# Patient Record
Sex: FEMALE | Race: WHITE | NOT HISPANIC OR LATINO | URBAN - METROPOLITAN AREA
[De-identification: names, ages, dates, MRNs, and addresses within clinical notes are randomized per-mention and may not be internally consistent; named-entity substitution may affect disease eponyms.]

---

## 2022-07-28 ENCOUNTER — EMERGENCY (EMERGENCY)
Facility: HOSPITAL | Age: 27
LOS: 1 days | Discharge: HOME | End: 2022-07-28
Attending: EMERGENCY MEDICINE
Payer: COMMERCIAL

## 2022-07-28 VITALS
TEMPERATURE: 99 F | WEIGHT: 175.05 LBS | OXYGEN SATURATION: 100 % | RESPIRATION RATE: 20 BRPM | HEART RATE: 108 BPM | SYSTOLIC BLOOD PRESSURE: 166 MMHG | DIASTOLIC BLOOD PRESSURE: 103 MMHG

## 2022-07-28 PROCEDURE — 99285 EMERGENCY DEPT VISIT HI MDM: CPT

## 2022-07-28 NOTE — ED ADULT TRIAGE NOTE - CHIEF COMPLAINT QUOTE
C/O LLQ abd pain for the past week days associated with nausea and constipation.  Reports no BM for 6 days until yesterday when she used an enema. Also reports white chunky and foul smelling vaginal discharge.  Reports new sexual partners. Was seen at urgent care and had lab work done but was sent here for imaging.

## 2022-07-29 VITALS
OXYGEN SATURATION: 100 % | TEMPERATURE: 98 F | HEART RATE: 105 BPM | SYSTOLIC BLOOD PRESSURE: 142 MMHG | RESPIRATION RATE: 18 BRPM | DIASTOLIC BLOOD PRESSURE: 83 MMHG

## 2022-07-29 LAB
APPEARANCE UR: CLEAR — SIGNIFICANT CHANGE UP
BACTERIA # UR AUTO: ABNORMAL
BILIRUB UR-MCNC: NEGATIVE — SIGNIFICANT CHANGE UP
C TRACH RRNA SPEC QL NAA+PROBE: SIGNIFICANT CHANGE UP
COLOR SPEC: YELLOW — SIGNIFICANT CHANGE UP
DIFF PNL FLD: NEGATIVE — SIGNIFICANT CHANGE UP
EPI CELLS # UR: ABNORMAL
GLUCOSE UR QL: NEGATIVE MG/DL — SIGNIFICANT CHANGE UP
HCG UR QL: NEGATIVE — SIGNIFICANT CHANGE UP
KETONES UR-MCNC: NEGATIVE — SIGNIFICANT CHANGE UP
LEUKOCYTE ESTERASE UR-ACNC: ABNORMAL
N GONORRHOEA RRNA SPEC QL NAA+PROBE: SIGNIFICANT CHANGE UP
NITRITE UR-MCNC: NEGATIVE — SIGNIFICANT CHANGE UP
PH UR: 6 — SIGNIFICANT CHANGE UP (ref 5–8)
PROT UR-MCNC: NEGATIVE — SIGNIFICANT CHANGE UP
RBC CASTS # UR COMP ASSIST: SIGNIFICANT CHANGE UP /HPF (ref 0–4)
SP GR SPEC: 1.01 — SIGNIFICANT CHANGE UP (ref 1.01–1.02)
SPECIMEN SOURCE: SIGNIFICANT CHANGE UP
UROBILINOGEN FLD QL: NEGATIVE MG/DL — SIGNIFICANT CHANGE UP
WBC UR QL: SIGNIFICANT CHANGE UP /HPF (ref 0–5)

## 2022-07-29 PROCEDURE — 96372 THER/PROPH/DIAG INJ SC/IM: CPT

## 2022-07-29 PROCEDURE — 87491 CHLMYD TRACH DNA AMP PROBE: CPT

## 2022-07-29 PROCEDURE — 76856 US EXAM PELVIC COMPLETE: CPT | Mod: 26

## 2022-07-29 PROCEDURE — 99284 EMERGENCY DEPT VISIT MOD MDM: CPT | Mod: 25

## 2022-07-29 PROCEDURE — 76830 TRANSVAGINAL US NON-OB: CPT

## 2022-07-29 PROCEDURE — 81025 URINE PREGNANCY TEST: CPT

## 2022-07-29 PROCEDURE — 87070 CULTURE OTHR SPECIMN AEROBIC: CPT

## 2022-07-29 PROCEDURE — 76856 US EXAM PELVIC COMPLETE: CPT

## 2022-07-29 PROCEDURE — 87086 URINE CULTURE/COLONY COUNT: CPT

## 2022-07-29 PROCEDURE — 76830 TRANSVAGINAL US NON-OB: CPT | Mod: 26

## 2022-07-29 PROCEDURE — 81001 URINALYSIS AUTO W/SCOPE: CPT

## 2022-07-29 PROCEDURE — 87591 N.GONORRHOEAE DNA AMP PROB: CPT

## 2022-07-29 RX ORDER — CEFTRIAXONE 500 MG/1
500 INJECTION, POWDER, FOR SOLUTION INTRAMUSCULAR; INTRAVENOUS ONCE
Refills: 0 | Status: COMPLETED | OUTPATIENT
Start: 2022-07-29 | End: 2022-07-29

## 2022-07-29 RX ORDER — IBUPROFEN 200 MG
600 TABLET ORAL ONCE
Refills: 0 | Status: COMPLETED | OUTPATIENT
Start: 2022-07-29 | End: 2022-07-29

## 2022-07-29 RX ORDER — FLUCONAZOLE 150 MG/1
150 TABLET ORAL ONCE
Refills: 0 | Status: COMPLETED | OUTPATIENT
Start: 2022-07-29 | End: 2022-07-29

## 2022-07-29 RX ADMIN — CEFTRIAXONE 500 MILLIGRAM(S): 500 INJECTION, POWDER, FOR SOLUTION INTRAMUSCULAR; INTRAVENOUS at 02:30

## 2022-07-29 RX ADMIN — Medication 100 MILLIGRAM(S): at 02:29

## 2022-07-29 RX ADMIN — FLUCONAZOLE 150 MILLIGRAM(S): 150 TABLET ORAL at 02:42

## 2022-07-29 RX ADMIN — Medication 600 MILLIGRAM(S): at 03:56

## 2022-07-29 NOTE — ED PROVIDER NOTE - CARE PLAN
Principal Discharge DX:	Acute PID (pelvic inflammatory disease)  Secondary Diagnosis:	Vaginal yeast infection   1

## 2022-07-29 NOTE — ED PROVIDER NOTE - PROGRESS NOTE DETAILS
advised on safe sex practices and importance of fu with GYN considering friability of cervix and known HPV.   pending US pelvis pt made aware of results and all incidental findings. given copy of reports. instructed on follow up and strict return precautions. pt verbalizes understanding and given opportunity to ask further questions

## 2022-07-29 NOTE — ED PROVIDER NOTE - OBJECTIVE STATEMENT
26 yo female hx of prior yeast infections presenting to the Er with vaginal discharge white thick and clump like associated with pelvic cramping. reports several new sexual partners without protection over the last few weeks.  went to urgent care and had pelvic exam and sti testing but results not given as of yet. reports that she has some irritation with urination. no medication given or taken for symptoms. LMP 2 weeks ago. no sti prophylaxis given at urgent care denies prior hx of sti. denies fever chills. reports constipation over the last two weeks which was cleared with fleets enema as well as mirralax.  reports hx of HPV with next pap in september.

## 2022-07-29 NOTE — ED PROVIDER NOTE - ATTENDING APP SHARED VISIT CONTRIBUTION OF CARE
Will treat empirically for PID, no fever or abdominal tenderness concerning for TOA. Close OBGYN follow up.

## 2022-07-29 NOTE — ED PROVIDER NOTE - PHYSICAL EXAMINATION
Gen: Well appearing in NAD  Head: NC/AT  Neck: trachea midline  Resp:  No distress  Ext: no deformities  Neuro:  A&O appears non focal  Skin:  Warm and dry as visualized  Psych:  Normal affect and mood     chaperone PCA DAVIS: normal external genitalia. + thick white clumped discharge. cervix firable appearing closed. NO CMT + TTP over left adenexa. no palpable masses.

## 2022-07-29 NOTE — ED PROVIDER NOTE - CLINICAL SUMMARY MEDICAL DECISION MAKING FREE TEXT BOX
female presenting to the ER with vaginal discarge appearing to be yeast like with associated pelvic pain. concern for STI due to various sexual partners. advised on results, abx coverage for potential sti exposure. will treat for PID and advised on fu with GYN

## 2022-07-29 NOTE — ED PROVIDER NOTE - PATIENT PORTAL LINK FT
You can access the FollowMyHealth Patient Portal offered by [facility] by registering at the following website: [email or web address]. By joining [facility]’s FollowMyHealth portal, you will also be able to view your health information using other applications (apps) compatible with our system.

## 2022-07-29 NOTE — ED PROVIDER NOTE - NS ED ATTENDING STATEMENT MOD
This was a shared visit with the WOLF. I reviewed and verified the documentation and independently performed the documented:

## 2022-07-29 NOTE — ED ADULT NURSE REASSESSMENT NOTE - NS ED NURSE REASSESS COMMENT FT1
Assumed care of pt at 0324 as stated in report from JANELL Clemente. Charting as noted. Patient A&O x4, denies pain/discomfort, denies CP/SOB. Updated on the plan of care. Call bell within reach, bed locked in lowest position. IV site flushed w/ NS. No redness, swelling or pain noted to site. No signs of acute distress noted, safety maintained.

## 2022-07-30 LAB
CULTURE RESULTS: SIGNIFICANT CHANGE UP
SPECIMEN SOURCE: SIGNIFICANT CHANGE UP

## 2022-08-01 LAB
CULTURE RESULTS: SIGNIFICANT CHANGE UP
SPECIMEN SOURCE: SIGNIFICANT CHANGE UP

## 2022-09-14 NOTE — ED ADULT TRIAGE NOTE - MEANS OF ARRIVAL
Aftercare Plan    [facility] SCHEDULING:  Today you were seen by a licensed mental health professional through [facility] and [facility], [facility] ([facility])  for a crisis assessment in the Emergency Department at [facility].  It is recommended that you follow up with your estabished providers (psychiatrist, mental health therapist, and/or primary care doctor - as relevant) as soon as possible. Coordinators from [facility] will be calling you in the next 24-48 hours to ensure that you have the resources you need.  You can also contact [facility] coordinators directly at [phone].    You have been scheduled the following appointments:   Date: Wednesday, 10/19/2022  Time: 1:30 pm - 2:30 pm  Provider: Francisco WEATHERS  CNP,RN  Location: [facility], [address] [place] [address]  Phone: [phone]  Type: Medication Mgmt - Initial (In-Person)  Patient Instructions  For all appointments: you will be sent information via email to fill out the intake paperwork online. Please complete required paperwork prior to your appointment. For telehealth appointments: you will also be sent a link to attend the appointment remotely. All forms need to be completed 24 hours prior to the appointment date/time. Please call us at [phone] 24 hours prior to your scheduled appointment to confirm that you plan to attend.    You have been referred to the [facility]. Our staff will contact you to schedule. They will continue you via phone during business hours on 9/15/22. This outpatient service will provide short-term, VIRTUAL sessions until you begin scheduled services with your long-term provider(s).  If you need to contact the Transition Clinic, please call [phone].     [facility] maintains an extensive network of licensed behavioral health providers to connect patients with the services they need.  We do not charge providers a fee to participate  ambulatory in our referral network.  We match patients with providers based on a patient s specific needs, insurance coverage, and location.  Our first effort will be to refer you to a provider within your care system, and will utilize providers outside your care system as needed.          If I am feeling unsafe or I am in a crisis, I will:   Contact my established care providers   Call the National Suicide Prevention Lifeline: 988  Go to the nearest emergency room   Call 911     Warning signs that I or other people might notice when a crisis is developing for me:   Feeling lonely/isolated at home  Feeling like a burden    Things I am able to do on my own to cope or help me feel better:   Exercise  Deep breathing  Biofeedback  Remember to let go of control - your  has a right to make his own choices    Things that I am able to do with others to cope or help me better:   Spend time with two daughters  Ask for help - you do not need to be the sole caretaker of your     Changes I can make to support my mental health and wellness:   Schedule an appointment with the transition clinic  Attended schedule therapy and medication management appointments  Consider CBT (cognitive behavioral therapy)  Make a plan to spend less time at home with your  and accept help from others - make a schedule.     People in my life that I can ask for help:   Alejandrina and Glo    [facility] has a mental health crisis team you can call 24/7: [facility] Crisis  [phone]    Other things that are important when I'm in crisis: Asking for help      Crisis Lines  Crisis Text Line  Text 199945  You will be connected with a trained live crisis counselor to provide support.    Por elizabeth, ezequielo  TORSTEN a 832732 o texto a 442-AYUDAME en WhatsApp    [employer] (LGBTQ Youth Crisis Line)  3.434.511.5402  text START to 684-989      Community Resources  Fast Tracker  Linking people to mental health and substance use disorder  "resources  [employer]     [place] Mental Health Warm Line  Peer to peer support  Monday thru Saturday, 12 pm to 10 pm  062.175.3988 or 3.584.023.8898  Text \"Support\" to 27136    National [place] [employer] Mental Illness (MISSY)  047.471.5696 or 1.888.MISSY.HELPS      Mental Health Apps  My3  https://myWikinvestpp.org/    VirtualHopeBox  https://Compute.org/apps/virtual-hope-box/           "